# Patient Record
Sex: FEMALE | Race: WHITE | NOT HISPANIC OR LATINO | URBAN - METROPOLITAN AREA
[De-identification: names, ages, dates, MRNs, and addresses within clinical notes are randomized per-mention and may not be internally consistent; named-entity substitution may affect disease eponyms.]

---

## 2017-04-15 ENCOUNTER — EMERGENCY (EMERGENCY)
Facility: HOSPITAL | Age: 82
LOS: 1 days | Discharge: HOME | End: 2017-04-15
Attending: EMERGENCY MEDICINE | Admitting: EMERGENCY MEDICINE
Payer: COMMERCIAL

## 2017-04-15 VITALS
RESPIRATION RATE: 18 BRPM | SYSTOLIC BLOOD PRESSURE: 135 MMHG | TEMPERATURE: 98 F | OXYGEN SATURATION: 99 % | DIASTOLIC BLOOD PRESSURE: 80 MMHG | HEART RATE: 88 BPM

## 2017-04-15 VITALS
OXYGEN SATURATION: 99 % | HEART RATE: 92 BPM | RESPIRATION RATE: 16 BRPM | DIASTOLIC BLOOD PRESSURE: 86 MMHG | SYSTOLIC BLOOD PRESSURE: 149 MMHG

## 2017-04-15 PROCEDURE — 73060 X-RAY EXAM OF HUMERUS: CPT | Mod: 26,LT

## 2017-04-15 PROCEDURE — 93010 ELECTROCARDIOGRAM REPORT: CPT | Mod: GV

## 2017-04-15 PROCEDURE — 72170 X-RAY EXAM OF PELVIS: CPT | Mod: 26

## 2017-04-15 PROCEDURE — 72125 CT NECK SPINE W/O DYE: CPT | Mod: 26

## 2017-04-15 PROCEDURE — 73030 X-RAY EXAM OF SHOULDER: CPT | Mod: 26,LT

## 2017-04-15 PROCEDURE — 70450 CT HEAD/BRAIN W/O DYE: CPT | Mod: 26

## 2017-04-15 PROCEDURE — 99285 EMERGENCY DEPT VISIT HI MDM: CPT | Mod: 25,GV

## 2017-04-15 PROCEDURE — 73562 X-RAY EXAM OF KNEE 3: CPT | Mod: 26,50

## 2017-04-15 NOTE — ED ADULT NURSE REASSESSMENT NOTE - NS ED NURSE REASSESS COMMENT FT1
Pt return from XR, pt continued to be restless and confused. Comfort measures provided. Bed in low position, side rails up, in sight of RN for safety. Pt calm and cooperative at this time. Awaiting ambulette transport to [place].

## 2017-04-15 NOTE — ED ADULT TRIAGE NOTE - CHIEF COMPLAINT QUOTE
Pt arrives from [facility] to r/o knee fractures s/p fall. Per EMS, staff at [facility] stated that they walked into pts room and found her on knees on the floor near her bed. Pt is verbal but A&Ox0; pt arrives with bilateral knee bandages in place. Pt is DNR/DNI.

## 2017-04-15 NOTE — ED PROVIDER NOTE - PMH
CHF (congestive heart failure)    CVA (cerebral vascular accident)    Gout    Hypertension    Osteoarthritis

## 2017-04-15 NOTE — ED POST DISCHARGE NOTE - REASON FOR FOLLOW-UP
Other Admin HALLE Parrish: Received call from Dr. Heck from [place]. Please fax imaging studies. Faxed [phone] w/ confirmation

## 2017-04-15 NOTE — ED ADULT NURSE NOTE - OBJECTIVE STATEMENT
Pt received in room 10 on stretcher from [facility]. Pt is awake, A&Ox0, NAD observed, respirations even and unlabored on room air. As per [facility] staff, pt is 89 YO female with h/o fall sent in for unwitnessed fall. Pt is confused, restless, paranoid on assessment. VS recorded. Comfort and safety measures provided. Call bell within reach. Pt to go for XR/CT.

## 2017-04-15 NOTE — ED ADULT NURSE NOTE - CHIEF COMPLAINT QUOTE
Pt arrives from [facility] to r/o knee fractures s/p fall. Per EMS, staff at [facility] stated that they walked into pts room and found her on knees on the floor near her bed. Pt is verbal but A&Ox0; pt arrives with bilateral knee bandages in place. Pt is DNR/DNI.

## 2017-04-15 NOTE — ED PROVIDER NOTE - ATTENDING CONTRIBUTION TO CARE
Case of a 89 y/o female patient with past medical history of HTN, CHF and CVA sent in to the ED from [facility] after a fall, patient with abrasions on her knees, sent for CT and X-rays. Will monitor patient closely. Agree with resident's physical exam, assessment and documentation

## 2017-04-15 NOTE — ED PROVIDER NOTE - OBJECTIVE STATEMENT
89yo F with a hx of advanced dementia p/w fall. Hx obtained from [facility]. Pt was found next to her bed on her knees with abrasions on her knees. She's in hospice/comfort care and pt's family did not want any labwork to be done, only XR's/CT's to r/o any acute process. Pt has not been wanting to move her LUE since being found on the ground. 89yo F with a hx of advanced dementia p/w fall. Hx obtained from [facility]. Pt was found next to her bed on her knees with abrasions on her knees. She's in hospice/comfort care and pt's family did not want any lab work to be done, only XR's/CT's to r/o any acute process. Pt has not been wanting to move her LUE since being found on the ground.

## 2017-04-15 NOTE — ED PROVIDER NOTE - MEDICAL DECISION MAKING DETAILS
Pt p/w ?LUE pain after being found on the floor next to the bed at [facility]. Pt has advanced Alzheimer's and can't give any hx or answer questions. Will obtain XR's of SAM (ROM of knees intact), CT H/kelly Barnett.

## 2024-01-08 NOTE — ED PROVIDER NOTE - ENMT, MLM
[FreeTextEntry1] : Exam findings and diagnosis were discussed at length with patient.  Recommendations including increased fiber intake, adequate daily hydration, stool softeners as needed, and sitz baths as needed and after bowel movements were discussed. Avoid constipation and diarrhea, avoid pushing/straining. Fiber goal 25g/day, recommend psyllium supplement. Adequate oral hydration - goal 64oz water/day OTC hemorrhoidal cream/suppository as needed. If worsens, can consider hydrocortisone 2.5% prn. Consider GI consultation if continued constipation despite above bowel regimen. F/u if symptoms worsen or persist. All questions answered, patient expressed understanding and is agreeable to this plan.
Airway patent, Nasal mucosa clear. Mouth with normal mucosa. Throat has no vesicles, no oropharyngeal exudates and uvula is midline.

## 2024-02-28 NOTE — ED ADULT NURSE NOTE - TEMPLATE
Can direct patient to chung. Looks like with coupon, 30 tabs is $10 at [facility].    For further medication management, he needs to establish with Behavioral [facility]. They tried contacting him but were unable to reach. Please give # for them to call.   Fall

## 2024-07-30 NOTE — ED PROVIDER NOTE - CONSTITUTIONAL APPEARANCE HYGIENE, MLM
well appearing
Render In Strict Bullet Format?: No
Initiate Treatment: mupirocin 2 % topical ointment BID\\nQuantity: 22.0 g  Days Supply: 30\\nSig: Apply BID to wound on right thumb until healed.
Detail Level: Zone